# Patient Record
Sex: FEMALE | Race: WHITE | HISPANIC OR LATINO | URBAN - METROPOLITAN AREA
[De-identification: names, ages, dates, MRNs, and addresses within clinical notes are randomized per-mention and may not be internally consistent; named-entity substitution may affect disease eponyms.]

---

## 2024-04-12 ENCOUNTER — APPOINTMENT (RX ONLY)
Dept: URBAN - METROPOLITAN AREA CLINIC 164 | Facility: CLINIC | Age: 44
Setting detail: DERMATOLOGY
End: 2024-04-12

## 2024-04-12 DIAGNOSIS — D18.0 HEMANGIOMA: ICD-10-CM

## 2024-04-12 DIAGNOSIS — L24 IRRITANT CONTACT DERMATITIS: ICD-10-CM

## 2024-04-12 DIAGNOSIS — L82.1 OTHER SEBORRHEIC KERATOSIS: ICD-10-CM

## 2024-04-12 DIAGNOSIS — L81.4 OTHER MELANIN HYPERPIGMENTATION: ICD-10-CM

## 2024-04-12 DIAGNOSIS — L57.8 OTHER SKIN CHANGES DUE TO CHRONIC EXPOSURE TO NONIONIZING RADIATION: ICD-10-CM

## 2024-04-12 DIAGNOSIS — D22 MELANOCYTIC NEVI: ICD-10-CM

## 2024-04-12 PROBLEM — D22.5 MELANOCYTIC NEVI OF TRUNK: Status: ACTIVE | Noted: 2024-04-12

## 2024-04-12 PROBLEM — D18.01 HEMANGIOMA OF SKIN AND SUBCUTANEOUS TISSUE: Status: ACTIVE | Noted: 2024-04-12

## 2024-04-12 PROBLEM — L24.9 IRRITANT CONTACT DERMATITIS, UNSPECIFIED CAUSE: Status: ACTIVE | Noted: 2024-04-12

## 2024-04-12 PROCEDURE — ? SUNSCREEN RECOMMENDATIONS

## 2024-04-12 PROCEDURE — ? PRESCRIPTION

## 2024-04-12 PROCEDURE — ? COUNSELING

## 2024-04-12 PROCEDURE — ? GENTLE SKIN CARE INSTRUCTIONS

## 2024-04-12 PROCEDURE — ? RECOMMENDATIONS

## 2024-04-12 PROCEDURE — 99203 OFFICE O/P NEW LOW 30 MIN: CPT

## 2024-04-12 PROCEDURE — ? FULL BODY SKIN EXAM

## 2024-04-12 RX ORDER — KETOCONAZOLE 20 MG/G
CREAM TOPICAL
Qty: 60 | Refills: 2 | Status: ERX | COMMUNITY
Start: 2024-04-12

## 2024-04-12 RX ORDER — TRIAMCINOLONE ACETONIDE 1 MG/G
CREAM TOPICAL
Qty: 30 | Refills: 1 | Status: ERX | COMMUNITY
Start: 2024-04-12

## 2024-04-12 RX ADMIN — TRIAMCINOLONE ACETONIDE: 1 CREAM TOPICAL at 00:00

## 2024-04-12 RX ADMIN — KETOCONAZOLE: 20 CREAM TOPICAL at 00:00

## 2024-04-12 ASSESSMENT — LOCATION SIMPLE DESCRIPTION DERM
LOCATION SIMPLE: LOWER BACK
LOCATION SIMPLE: RIGHT LOWER BACK
LOCATION SIMPLE: ABDOMEN

## 2024-04-12 ASSESSMENT — LOCATION ZONE DERM: LOCATION ZONE: TRUNK

## 2024-04-12 ASSESSMENT — SEVERITY ASSESSMENT 2020: SEVERITY 2020: MILD

## 2024-04-12 ASSESSMENT — LOCATION DETAILED DESCRIPTION DERM
LOCATION DETAILED: RIGHT SUPERIOR MEDIAL MIDBACK
LOCATION DETAILED: XIPHOID
LOCATION DETAILED: SUPERIOR LUMBAR SPINE

## 2024-04-19 ENCOUNTER — APPOINTMENT (RX ONLY)
Dept: URBAN - METROPOLITAN AREA CLINIC 164 | Facility: CLINIC | Age: 44
Setting detail: DERMATOLOGY
End: 2024-04-19

## 2024-04-19 DIAGNOSIS — Z41.9 ENCOUNTER FOR PROCEDURE FOR PURPOSES OTHER THAN REMEDYING HEALTH STATE, UNSPECIFIED: ICD-10-CM

## 2024-04-19 PROCEDURE — ? PRODUCT LINE (ELTA MD)

## 2024-04-19 PROCEDURE — ? COSMETIC CONSULTATION: CHEMICAL PEELS

## 2024-04-19 PROCEDURE — ? COSMETIC QUOTE

## 2024-04-19 PROCEDURE — ? ADDITIONAL NOTES

## 2024-04-19 PROCEDURE — ? PRODUCT LINE (OBAGI)

## 2024-04-19 PROCEDURE — ? COSMETIC CONSULTATION: HYDRAFACIAL

## 2024-04-19 PROCEDURE — ? COSMETIC CONSULTATION: PRODUCTS

## 2024-04-19 NOTE — PROCEDURE: PRODUCT LINE (OBAGI)
Name Of Product 1: 15% Vitamin C Serum
Product 11 Application Directions: Apply AM & PM after cleansing.
Name Of Product 22: Nu-Derm Gentle Cleanser
Product 30 Units: 0
Product 39 Price (In Dollars - Numeric Only, No Special Characters Or $): 0.00
Product 16 Application Directions: Apply in the AM before moisturizer.
Product 27 Price (In Dollars - Numeric Only, No Special Characters Or $): 94
Name Of Product 9: Obagi 360 Exfoliating Cleanser
Name Of Product 34: C-Clarifying Serum (normal to oily)
Product 19 Price (In Dollars - Numeric Only, No Special Characters Or $): 118
Product 3 Application Directions: Apply AM & PM 7x a week.
Name Of Product 14: ELASTIderm Facial Serum
Product 24 Application Directions: Apply to cleansed skin every night.
Product 9 Price (In Dollars - Numeric Only, No Special Characters Or $): 42
Name Of Product 4: Hydrate Luxe
Product 14 Price (In Dollars - Numeric Only, No Special Characters Or $): 198
Product 34 Price (In Dollars - Numeric Only, No Special Characters Or $): 148
Product 22 Price (In Dollars - Numeric Only, No Special Characters Or $): 44
Product 19 Application Directions: Apply to cleansed skin AM & PM.
Product 6 Application Directions: On cleansed skin apply to upper lash line nightly.
Product 1 Price (In Dollars - Numeric Only, No Special Characters Or $): 115.50
Name Of Product 12: Obagi SunShield SPF 50 Matte
Name Of Product 31: Hydrate Light
Name Of Product 17: SunShield Broad Spectrum SPF 50 Tint (Warm)
Name Of Product 7: Obagi Toner
Product 4 Price (In Dollars - Numeric Only, No Special Characters Or $): 75
Product 12 Price (In Dollars - Numeric Only, No Special Characters Or $): 54
Detail Level: Zone
Name Of Product 25: Obagi C-Clarifying Serum
Product 27 Application Directions: Apply AM before moisturizer
Name Of Product 20: CLENZIderm Daily Care Foaming Cleanser
Product 1 Application Directions: Apply AM 7x a week.
Product 31 Price (In Dollars - Numeric Only, No Special Characters Or $): 58
Name Of Product 28: Obagi Cleansing Wipes
Product 9 Application Directions: Cleanse AM & PM 7x a week.
Product 34 Application Directions: Apply 1x daily in the AM
Assigning Risk Information: Per AMA, level of risk is based upon consequences of the problem(s) addressed at the encounter when appropriately treated. Risk also includes medical decision making related to the need to initiate or forego further testing, treatment and/or hospitalization. Over the counter medication are assigned a risk level of low. Prescription medication management is assigned a risk level of moderate.
Product 22 Application Directions: Cleanse AM & PM, 7x a week.
Product 7 Price (In Dollars - Numeric Only, No Special Characters Or $): 43
Product 28 Price (In Dollars - Numeric Only, No Special Characters Or $): 27
Product 17 Application Directions: Apply in the AM & reapply if prolong in the sun.
Risk Of Complication Category: No MDM
Name Of Product 35: Cleansing Wipes
Name Of Product 10: Obagi Rebalance
Product 32 Price (In Dollars - Numeric Only, No Special Characters Or $): 92.66
Name Of Product 2: ELASTIderm Eye Serum
Product 4 Application Directions: Apply AM & PM
Name Of Product 15: Tretinoin 0.1%
Product 25 Application Directions: Apply after cleansing.
Product 10 Price (In Dollars - Numeric Only, No Special Characters Or $): 110
Product 31 Application Directions: Apply AM & PM after cleansing
Name Of Product 5: Hydrate Moisturizer
Product 15 Price (In Dollars - Numeric Only, No Special Characters Or $): 79
Name Of Product 23: Tretinoin 0.25
Name Of Product 18: Tretinoin 0.5
Product 23 Price (In Dollars - Numeric Only, No Special Characters Or $): 85
Allow Plan To Count Towards E/M Coding: Yes
Name Of Product 32: Nu-Derm Clear
Product 2 Price (In Dollars - Numeric Only, No Special Characters Or $): 107
Name Of Product 13: Obagi Nu-Derm Normal to Dry kit
Product 35 Price (In Dollars - Numeric Only, No Special Characters Or $): 25
Product 5 Price (In Dollars - Numeric Only, No Special Characters Or $): 55
Name Of Product 8: Obagi Sun Shield Tint SPF (Cool)
Product 32 Application Directions: Apply AM & PM to the dark areas.
Name Of Product 26: Nu-Derm Foaming Cleanser
Product 2 Application Directions: Apply eye serum AM and eye cream in the PM.
Name Of Product 21: CLENZIderm Pore Therapy
Product 15 Application Directions: Apply PM after cleansing & serums before moisturizer.
Product 18 Price (In Dollars - Numeric Only, No Special Characters Or $): 92
Product 10 Application Directions: Apply AM & PM as a moisturizer.
Name Of Product 33: Vitamin-C Serum 20%
Product 13 Price (In Dollars - Numeric Only, No Special Characters Or $): 500
Product 23 Application Directions: Apply a pea size amount to finger & apply externally & bring in internally on the face every other night.
Product 29 Application Directions: Apply to cleansed eye area am & pm daily
Product 13 Application Directions: Written instructions were handed to patient.
Product 18 Application Directions: Apply 2-3x a week only in the PM.
Name Of Product 11: ELASTIderm Neck & Decollete Concentrate
Name Of Product 3: Daily Hydro-Drops
Product 33 Price (In Dollars - Numeric Only, No Special Characters Or $): 139
Name Of Product 16: Professional-C Serum 10%
Name Of Product 24: Nu-Cil Eyebrow Boosting Serum
Name Of Product 6: Nu-Cil Eyelash Serum
Product 3 Price (In Dollars - Numeric Only, No Special Characters Or $): 98
Product 11 Price (In Dollars - Numeric Only, No Special Characters Or $): 250
Name Of Product 30: Obagi Physical Defense SPF 40
Product 8 Units: 1
Product 26 Application Directions: Cleanse AM & double cleanse PM
Name Of Product 19: ELASTIderm Eye Cream
Name Of Product 27: Obagi Professional-C Serum 10%
Product 8 Application Directions: Apply after cleansing in the AM. Reapply every two hours if prolong in the sun.
Product 30 Price (In Dollars - Numeric Only, No Special Characters Or $): 60
Product 21 Application Directions: Apply as a toner after cleansing or exfoliating
Product 6 Price (In Dollars - Numeric Only, No Special Characters Or $): 120
Product 24 Price (In Dollars - Numeric Only, No Special Characters Or $): 145

## 2024-04-19 NOTE — PROCEDURE: ADDITIONAL NOTES
Detail Level: Zone
Render Risk Assessment In Note?: no
Additional Notes: Patient came in for a cosmetic consult. Patients concerns are brown spots, dry skin & is seeking anti-aging routine. I recommended monthly Hydrafacials and we also spoke on the benefits of chemical peels. As far as a skin regimen, I recommended all La-Roche Posay for now. [facility] also [facility] & EltaMD UV AOX Elements.\\n\\nPatient was pleased with consult & left with literature.\\n\\nPatient stated she will call back to schedule.

## 2024-04-19 NOTE — PROCEDURE: PRODUCT LINE (ELTA MD)
Product 4 Application Directions: Apply AM 7x a week.
Product 46 Units: 0
Risk Of Complication Category: No MDM
Product 24 Price (In Dollars - Numeric Only, No Special Characters Or $): 0.00
Product 7 Price (In Dollars - Numeric Only, No Special Characters Or $): 40
Product 11 Application Directions: Apply to body & rub in every 1-2 hours.
Product 2 Price (In Dollars - Numeric Only, No Special Characters Or $): 36
Product 9 Price (In Dollars - Numeric Only, No Special Characters Or $): 32
Allow Plan To Count Towards E/M Coding: Yes
Name Of Product 5: UV Clear (Tinted) SPF 46
Product 9 Application Directions: Apply to the face & neck every 1-2 hours.
Product 2 Application Directions: Apply AM 7x a week. Reapply every 2 hours if exposed to the sun.
Product 5 Price (In Dollars - Numeric Only, No Special Characters Or $): 43
Name Of Product 3: UV Pure
Name Of Product 10: UV Daily Tinted
Product 10 Price (In Dollars - Numeric Only, No Special Characters Or $): 38
Product 5 Application Directions: Apply AM after cleansing.
Product 7 Application Directions: Apply AM after cleansing. Reapply every 2 hours if prolong in the sun.
Name Of Product 6: Foaming Facial Cleanser
Product 3 Application Directions: Apply AM after moisturizer. Reapply if prolong in the sun every 2 hours.
Name Of Product 1: UV AOX Elements
Name Of Product 8: Elta MD UV Clear
Product 10 Application Directions: Apply in the AM after moisturizer.
Product 1 Price (In Dollars - Numeric Only, No Special Characters Or $): 45
Product 8 Price (In Dollars - Numeric Only, No Special Characters Or $): 41
Name Of Product 4: ELTA MD UV Sport
Detail Level: Zone
Product 1 Units: 1
Product 4 Price (In Dollars - Numeric Only, No Special Characters Or $): 25.50
Name Of Product 11: AOX Mist SPF 40
Product 1 Application Directions: Apply AM 7x a week or when exposed to the sun.
Product 6 Application Directions: Cleanse AM & PM everyday.
Product 8 Application Directions: Apply in the AM after moisturizing.
Name Of Product 2: Elta MD UV Daily
Name Of Product 7: UV Replenish
Assigning Risk Information: Per AMA, level of risk is based upon consequences of the problem(s) addressed at the encounter when appropriately treated. Risk also includes medical decision making related to the need to initiate or forego further testing, treatment and/or hospitalization. Over the counter medication are assigned a risk level of low. Prescription medication management is assigned a risk level of moderate.
Name Of Product 9: Elta MD UV Pure